# Patient Record
Sex: MALE | Race: WHITE | Employment: FULL TIME | URBAN - METROPOLITAN AREA
[De-identification: names, ages, dates, MRNs, and addresses within clinical notes are randomized per-mention and may not be internally consistent; named-entity substitution may affect disease eponyms.]

---

## 2021-04-13 ENCOUNTER — HOSPITAL ENCOUNTER (EMERGENCY)
Age: 61
Discharge: HOME | End: 2021-04-13
Attending: EMERGENCY MEDICINE
Payer: COMMERCIAL

## 2021-04-13 ENCOUNTER — APPOINTMENT (OUTPATIENT)
Dept: CT IMAGING | Age: 61
End: 2021-04-13
Payer: COMMERCIAL

## 2021-04-13 ENCOUNTER — APPOINTMENT (OUTPATIENT)
Dept: NUCLEAR MEDICINE | Age: 61
End: 2021-04-13
Payer: COMMERCIAL

## 2021-04-13 ENCOUNTER — APPOINTMENT (OUTPATIENT)
Dept: GENERAL RADIOLOGY | Age: 61
End: 2021-04-13
Payer: COMMERCIAL

## 2021-04-13 VITALS
DIASTOLIC BLOOD PRESSURE: 70 MMHG | BODY MASS INDEX: 23.59 KG/M2 | OXYGEN SATURATION: 98 % | WEIGHT: 178 LBS | HEIGHT: 73 IN | SYSTOLIC BLOOD PRESSURE: 131 MMHG | RESPIRATION RATE: 18 BRPM | TEMPERATURE: 98.6 F | HEART RATE: 74 BPM

## 2021-04-13 DIAGNOSIS — K25.9 GASTRIC ULCER, UNSPECIFIED CHRONICITY, UNSPECIFIED WHETHER GASTRIC ULCER HEMORRHAGE OR PERFORATION PRESENT: Primary | ICD-10-CM

## 2021-04-13 DIAGNOSIS — R07.89 CHEST PRESSURE: ICD-10-CM

## 2021-04-13 LAB
ALBUMIN SERPL-MCNC: 4.1 GM/DL (ref 3.4–5)
ALP BLD-CCNC: 70 IU/L (ref 40–129)
ALT SERPL-CCNC: 32 U/L (ref 10–40)
ANION GAP SERPL CALCULATED.3IONS-SCNC: 7 MMOL/L (ref 4–16)
AST SERPL-CCNC: 16 IU/L (ref 15–37)
BACTERIA: NEGATIVE /HPF
BASOPHILS ABSOLUTE: 0.1 K/CU MM
BASOPHILS RELATIVE PERCENT: 0.4 % (ref 0–1)
BILIRUB SERPL-MCNC: 0.5 MG/DL (ref 0–1)
BILIRUBIN URINE: NEGATIVE MG/DL
BLOOD, URINE: ABNORMAL
BUN BLDV-MCNC: 22 MG/DL (ref 6–23)
CALCIUM SERPL-MCNC: 9.7 MG/DL (ref 8.3–10.6)
CHLORIDE BLD-SCNC: 94 MMOL/L (ref 99–110)
CLARITY: CLEAR
CO2: 28 MMOL/L (ref 21–32)
COLOR: YELLOW
CREAT SERPL-MCNC: 0.9 MG/DL (ref 0.9–1.3)
DIFFERENTIAL TYPE: ABNORMAL
EKG ATRIAL RATE: 75 BPM
EKG ATRIAL RATE: 76 BPM
EKG DIAGNOSIS: NORMAL
EKG DIAGNOSIS: NORMAL
EKG P AXIS: 12 DEGREES
EKG P AXIS: 13 DEGREES
EKG P-R INTERVAL: 134 MS
EKG P-R INTERVAL: 148 MS
EKG Q-T INTERVAL: 366 MS
EKG Q-T INTERVAL: 374 MS
EKG QRS DURATION: 74 MS
EKG QRS DURATION: 80 MS
EKG QTC CALCULATION (BAZETT): 411 MS
EKG QTC CALCULATION (BAZETT): 417 MS
EKG R AXIS: 41 DEGREES
EKG R AXIS: 42 DEGREES
EKG T AXIS: 58 DEGREES
EKG T AXIS: 58 DEGREES
EKG VENTRICULAR RATE: 75 BPM
EKG VENTRICULAR RATE: 76 BPM
EOSINOPHILS ABSOLUTE: 0.1 K/CU MM
EOSINOPHILS RELATIVE PERCENT: 0.4 % (ref 0–3)
GFR AFRICAN AMERICAN: >60 ML/MIN/1.73M2
GFR NON-AFRICAN AMERICAN: >60 ML/MIN/1.73M2
GLUCOSE BLD-MCNC: 135 MG/DL (ref 70–99)
GLUCOSE, URINE: NEGATIVE MG/DL
HCT VFR BLD CALC: 49.8 % (ref 42–52)
HEMOGLOBIN: 16.5 GM/DL (ref 13.5–18)
IMMATURE NEUTROPHIL %: 0.4 % (ref 0–0.43)
KETONES, URINE: NEGATIVE MG/DL
LEUKOCYTE ESTERASE, URINE: NEGATIVE
LIPASE: 104 IU/L (ref 13–60)
LV EF: 53 %
LV EF: 60 %
LVEF MODALITY: NORMAL
LVEF MODALITY: NORMAL
LYMPHOCYTES ABSOLUTE: 2.3 K/CU MM
LYMPHOCYTES RELATIVE PERCENT: 13.6 % (ref 24–44)
MCH RBC QN AUTO: 30.1 PG (ref 27–31)
MCHC RBC AUTO-ENTMCNC: 33.1 % (ref 32–36)
MCV RBC AUTO: 90.9 FL (ref 78–100)
MONOCYTES ABSOLUTE: 1 K/CU MM
MONOCYTES RELATIVE PERCENT: 5.8 % (ref 0–4)
MUCUS: ABNORMAL HPF
NITRITE URINE, QUANTITATIVE: NEGATIVE
NUCLEATED RBC %: 0 %
PDW BLD-RTO: 12.6 % (ref 11.7–14.9)
PH, URINE: 6 (ref 5–8)
PLATELET # BLD: 404 K/CU MM (ref 140–440)
PMV BLD AUTO: 8.9 FL (ref 7.5–11.1)
POTASSIUM SERPL-SCNC: 4.6 MMOL/L (ref 3.5–5.1)
PRO-BNP: 38.86 PG/ML
PROTEIN UA: NEGATIVE MG/DL
RBC # BLD: 5.48 M/CU MM (ref 4.6–6.2)
RBC URINE: ABNORMAL /HPF (ref 0–3)
SEGMENTED NEUTROPHILS ABSOLUTE COUNT: 13.2 K/CU MM
SEGMENTED NEUTROPHILS RELATIVE PERCENT: 79.4 % (ref 36–66)
SODIUM BLD-SCNC: 129 MMOL/L (ref 135–145)
SPECIFIC GRAVITY UA: 1.01 (ref 1–1.03)
TOTAL IMMATURE NEUTOROPHIL: 0.06 K/CU MM
TOTAL NUCLEATED RBC: 0 K/CU MM
TOTAL PROTEIN: 7.3 GM/DL (ref 6.4–8.2)
TRICHOMONAS: ABNORMAL /HPF
TROPONIN T: <0.01 NG/ML
TROPONIN T: <0.01 NG/ML
UROBILINOGEN, URINE: NEGATIVE MG/DL (ref 0.2–1)
WBC # BLD: 16.6 K/CU MM (ref 4–10.5)
WBC UA: <1 /HPF (ref 0–2)

## 2021-04-13 PROCEDURE — 93005 ELECTROCARDIOGRAM TRACING: CPT | Performed by: EMERGENCY MEDICINE

## 2021-04-13 PROCEDURE — 71275 CT ANGIOGRAPHY CHEST: CPT

## 2021-04-13 PROCEDURE — 71045 X-RAY EXAM CHEST 1 VIEW: CPT

## 2021-04-13 PROCEDURE — A9500 TC99M SESTAMIBI: HCPCS | Performed by: INTERNAL MEDICINE

## 2021-04-13 PROCEDURE — 2500000003 HC RX 250 WO HCPCS: Performed by: EMERGENCY MEDICINE

## 2021-04-13 PROCEDURE — 80053 COMPREHEN METABOLIC PANEL: CPT

## 2021-04-13 PROCEDURE — C9113 INJ PANTOPRAZOLE SODIUM, VIA: HCPCS | Performed by: EMERGENCY MEDICINE

## 2021-04-13 PROCEDURE — 2580000003 HC RX 258: Performed by: EMERGENCY MEDICINE

## 2021-04-13 PROCEDURE — 93010 ELECTROCARDIOGRAM REPORT: CPT | Performed by: INTERNAL MEDICINE

## 2021-04-13 PROCEDURE — 96374 THER/PROPH/DIAG INJ IV PUSH: CPT

## 2021-04-13 PROCEDURE — 3430000000 HC RX DIAGNOSTIC RADIOPHARMACEUTICAL: Performed by: INTERNAL MEDICINE

## 2021-04-13 PROCEDURE — 83880 ASSAY OF NATRIURETIC PEPTIDE: CPT

## 2021-04-13 PROCEDURE — 6360000002 HC RX W HCPCS: Performed by: EMERGENCY MEDICINE

## 2021-04-13 PROCEDURE — 6360000004 HC RX CONTRAST MEDICATION: Performed by: EMERGENCY MEDICINE

## 2021-04-13 PROCEDURE — 93017 CV STRESS TEST TRACING ONLY: CPT

## 2021-04-13 PROCEDURE — 93306 TTE W/DOPPLER COMPLETE: CPT

## 2021-04-13 PROCEDURE — 74177 CT ABD & PELVIS W/CONTRAST: CPT

## 2021-04-13 PROCEDURE — 99243 OFF/OP CNSLTJ NEW/EST LOW 30: CPT | Performed by: INTERNAL MEDICINE

## 2021-04-13 PROCEDURE — 85025 COMPLETE CBC W/AUTO DIFF WBC: CPT

## 2021-04-13 PROCEDURE — 6360000002 HC RX W HCPCS: Performed by: INTERNAL MEDICINE

## 2021-04-13 PROCEDURE — 84484 ASSAY OF TROPONIN QUANT: CPT

## 2021-04-13 PROCEDURE — 99285 EMERGENCY DEPT VISIT HI MDM: CPT

## 2021-04-13 PROCEDURE — 6370000000 HC RX 637 (ALT 250 FOR IP): Performed by: EMERGENCY MEDICINE

## 2021-04-13 PROCEDURE — 83690 ASSAY OF LIPASE: CPT

## 2021-04-13 PROCEDURE — 81001 URINALYSIS AUTO W/SCOPE: CPT

## 2021-04-13 PROCEDURE — 96375 TX/PRO/DX INJ NEW DRUG ADDON: CPT

## 2021-04-13 PROCEDURE — 78452 HT MUSCLE IMAGE SPECT MULT: CPT

## 2021-04-13 RX ORDER — PANTOPRAZOLE SODIUM 40 MG/10ML
40 INJECTION, POWDER, LYOPHILIZED, FOR SOLUTION INTRAVENOUS ONCE
Status: COMPLETED | OUTPATIENT
Start: 2021-04-13 | End: 2021-04-13

## 2021-04-13 RX ORDER — SODIUM CHLORIDE 0.9 % (FLUSH) 0.9 %
10 SYRINGE (ML) INJECTION
Status: COMPLETED | OUTPATIENT
Start: 2021-04-13 | End: 2021-04-13

## 2021-04-13 RX ORDER — 0.9 % SODIUM CHLORIDE 0.9 %
1000 INTRAVENOUS SOLUTION INTRAVENOUS ONCE
Status: COMPLETED | OUTPATIENT
Start: 2021-04-13 | End: 2021-04-13

## 2021-04-13 RX ORDER — ASPIRIN 81 MG/1
324 TABLET, CHEWABLE ORAL ONCE
Status: COMPLETED | OUTPATIENT
Start: 2021-04-13 | End: 2021-04-13

## 2021-04-13 RX ORDER — SUCRALFATE 1 G/1
1 TABLET ORAL 4 TIMES DAILY
Qty: 120 TABLET | Refills: 3 | Status: SHIPPED | OUTPATIENT
Start: 2021-04-13

## 2021-04-13 RX ORDER — MORPHINE SULFATE 4 MG/ML
4 INJECTION, SOLUTION INTRAMUSCULAR; INTRAVENOUS ONCE
Status: COMPLETED | OUTPATIENT
Start: 2021-04-13 | End: 2021-04-13

## 2021-04-13 RX ORDER — FAMOTIDINE 40 MG/1
40 TABLET, FILM COATED ORAL DAILY
Qty: 30 TABLET | Refills: 0 | Status: SHIPPED | OUTPATIENT
Start: 2021-04-13

## 2021-04-13 RX ORDER — PANTOPRAZOLE SODIUM 20 MG/1
20 TABLET, DELAYED RELEASE ORAL DAILY
Qty: 30 TABLET | Refills: 0 | Status: SHIPPED | OUTPATIENT
Start: 2021-04-13

## 2021-04-13 RX ORDER — ONDANSETRON 2 MG/ML
4 INJECTION INTRAMUSCULAR; INTRAVENOUS ONCE
Status: COMPLETED | OUTPATIENT
Start: 2021-04-13 | End: 2021-04-13

## 2021-04-13 RX ORDER — ONDANSETRON 4 MG/1
4 TABLET, ORALLY DISINTEGRATING ORAL EVERY 8 HOURS PRN
Qty: 15 TABLET | Refills: 0 | Status: SHIPPED | OUTPATIENT
Start: 2021-04-13

## 2021-04-13 RX ADMIN — MORPHINE SULFATE 4 MG: 4 INJECTION, SOLUTION INTRAMUSCULAR; INTRAVENOUS at 10:41

## 2021-04-13 RX ADMIN — Medication 10 MILLICURIE: at 10:10

## 2021-04-13 RX ADMIN — Medication 30 MILLICURIE: at 11:45

## 2021-04-13 RX ADMIN — ASPIRIN 324 MG: 81 TABLET, CHEWABLE ORAL at 09:12

## 2021-04-13 RX ADMIN — ONDANSETRON 4 MG: 2 INJECTION INTRAMUSCULAR; INTRAVENOUS at 10:41

## 2021-04-13 RX ADMIN — FAMOTIDINE 20 MG: 10 INJECTION INTRAVENOUS at 10:41

## 2021-04-13 RX ADMIN — REGADENOSON 0.4 MG: 0.08 INJECTION, SOLUTION INTRAVENOUS at 11:44

## 2021-04-13 RX ADMIN — SODIUM CHLORIDE, PRESERVATIVE FREE 10 ML: 5 INJECTION INTRAVENOUS at 11:00

## 2021-04-13 RX ADMIN — SODIUM CHLORIDE 1000 ML: 9 INJECTION, SOLUTION INTRAVENOUS at 10:24

## 2021-04-13 RX ADMIN — IOPAMIDOL 75 ML: 755 INJECTION, SOLUTION INTRAVENOUS at 11:00

## 2021-04-13 RX ADMIN — PANTOPRAZOLE SODIUM 40 MG: 40 INJECTION, POWDER, FOR SOLUTION INTRAVENOUS at 13:24

## 2021-04-13 ASSESSMENT — PAIN DESCRIPTION - LOCATION: LOCATION: ABDOMEN

## 2021-04-13 ASSESSMENT — PAIN DESCRIPTION - PAIN TYPE: TYPE: ACUTE PAIN

## 2021-04-13 ASSESSMENT — PAIN SCALES - GENERAL
PAINLEVEL_OUTOF10: 6
PAINLEVEL_OUTOF10: 3

## 2021-04-13 NOTE — ED PROVIDER NOTES
CHIEF COMPLAINT  Chief Complaint   Patient presents with    Abdominal Pain    Chest Pain       HPI  Phong Louise is a 57 y.o. male with history of relative previous health, exercises for an hour daily, no daily medications who presents 1 week of abdominal and epigastric pain which was burning, caused him to have associated nausea and decreased appetite. He states that yearly he has approximately a week where similar signs and symptoms occur. He denies any blood per rectum, any vomiting but he is only been drinking water and he has been attempting to rest.  Today he was at work, was working and he developed centralized chest tightness with radiation to bilateral scapula with shortness of breath, diaphoresis and lightheadedness. At that time he started to become concerned it was his heart and decided to present here to the department. In ER, lateral leg swelling, history of DVT or PE. He denies any coronavirus exposure. REVIEW OF SYSTEMS  Review of Systems   History obtained from chart review, the patient and wife at bedside  General ROS: negative for - chills or fever  Ophthalmic ROS: negative for - decreased vision or double vision  ENT ROS: negative for - headaches  Hematological and Lymphatic ROS: negative for - bleeding problems  Endocrine ROS: negative for - unexpected weight changes  Respiratory ROS: positive for - shortness of breath  Cardiovascular ROS: positive for - chest pain  Gastrointestinal ROS: positive for -epigastric pain, nausea  Genito-Urinary ROS: no dysuria, trouble voiding, or hematuria  Musculoskeletal ROS: negative for - joint pain, joint stiffness or muscle pain  Neurological ROS: no TIA or stroke symptoms      PAST MEDICAL HISTORY  History reviewed. No pertinent past medical history. FAMILY HISTORY  History reviewed. No pertinent family history.     SOCIAL HISTORY  Social History     Socioeconomic History    Marital status:      Spouse name: None    Number of children: None    Years of education: None    Highest education level: None   Occupational History    None   Social Needs    Financial resource strain: None    Food insecurity     Worry: None     Inability: None    Transportation needs     Medical: None     Non-medical: None   Tobacco Use    Smoking status: Never Smoker    Smokeless tobacco: Never Used   Substance and Sexual Activity    Alcohol use: Yes     Alcohol/week: 0.0 standard drinks     Comment: SOCIALLY    Drug use: No    Sexual activity: None   Lifestyle    Physical activity     Days per week: None     Minutes per session: None    Stress: None   Relationships    Social connections     Talks on phone: None     Gets together: None     Attends Faith service: None     Active member of club or organization: None     Attends meetings of clubs or organizations: None     Relationship status: None    Intimate partner violence     Fear of current or ex partner: None     Emotionally abused: None     Physically abused: None     Forced sexual activity: None   Other Topics Concern    None   Social History Narrative    None       SURGICAL HISTORY  Past Surgical History:   Procedure Laterality Date    LUMBAR [address] (4&5), 1996 (5, S1), 2013    PILONIDAL CYST EXCISION         CURRENT MEDICATIONS  No current facility-administered medications on file prior to encounter. No current outpatient medications on file prior to encounter. ALLERGIES  No Known Allergies    PHYSICAL EXAM  VITAL SIGNS: /70   Pulse 74   Temp 98.6 °F (37 °C) (Oral)   Resp 18   Ht 6' 1\" (1.854 m)   Wt 178 lb (80.7 kg)   SpO2 98%   BMI 23.48 kg/m²   Constitutional: Well developed, Well nourished, No acute distress, Non-toxic appearance. HENT: Normocephalic, Atraumatic, Bilateral external ears normal, Oropharynx moist, No oral exudates, Nose normal.   Eyes: PERRL, EOMI, Conjunctiva normal, No discharge.    Neck: Normal range of motion, Supple, No stridor. Cardiovascular: Normal heart rate, Normal rhythm, No murmurs, No rubs, No gallops. Thorax & Lungs: Normal breath sounds, No respiratory distress, No wheezing, No chest tenderness. Abdomen: Bowel sounds normal, Soft, No tenderness, no guarding, no rebound, No masses, No pulsatile masses. Skin: Warm, diaphoretic with face flushed, No erythema, No rash. Extremities: Intact distal pulses, No edema, No tenderness, No cyanosis, No clubbing. No palpable cord  Musculoskeletal: Good gross range of motion in all major joints. No major deformities noted. Neurologic: Alert & oriented x 3, Normal gross motor function, Normal gross sensory function, No focal deficits noted. Psychiatric: Affect normal    EKG  839 4/13 EKG Interpretation    Interpreted by emergency department physician    Rhythm: normal sinus   Rate: normal  Axis: normal  Ectopy: none  Conduction: normal  ST Segments: Hyperacute T waves in inferior and lateral leads  T Waves: hyperacute in  multiple  Q Waves: none    Clinical Impression: non-specific EKG    Precious Craft    EKG Interpretation from April 13 at 64    Interpreted by emergency department physician    Rhythm: normal sinus   Rate: normal  Axis: normal  Ectopy: none  Conduction: normal  ST Segments: Hyperacute ST segments inferior and lateral  T Waves: hyperacute in inferior and lateral  Q Waves: none    Clinical Impression: Stable from initial EKG, continued hyperacute segments, discussed with cardiology who states that they think it is likely secondary to this patient's underlying body habitus and less likely ischemia, they plan on ordering an echo    Formerly [facility], later [facility]      RADIOLOGY/PROCEDURES/LABS  Last Imaging results   NM MYOCARDIAL SPECT REST EXERCISE OR RX   Final Result      CT ABDOMEN PELVIS W IV CONTRAST Additional Contrast? None   Final Result   1.  Focal irregularity is present along the posterior wall of the gastric   antrum with possible mucosal hyperemia, suggestive of a gastric ulcer. Correlation with upper endoscopy is recommended. No evidence of perforation. 2. No evidence of pulmonary embolism or acute process within the chest.         CTA PULMONARY W CONTRAST   Final Result   1. Focal irregularity is present along the posterior wall of the gastric   antrum with possible mucosal hyperemia, suggestive of a gastric ulcer. Correlation with upper endoscopy is recommended. No evidence of perforation. 2. No evidence of pulmonary embolism or acute process within the chest.         XR CHEST PORTABLE   Final Result   No acute cardiopulmonary disease.              Imaging reviewed by myself, discussed with cardiology    Labs Reviewed   CBC WITH AUTO DIFFERENTIAL - Abnormal; Notable for the following components:       Result Value    WBC 16.6 (*)     Segs Relative 79.4 (*)     Lymphocytes % 13.6 (*)     Monocytes % 5.8 (*)     All other components within normal limits   COMPREHENSIVE METABOLIC PANEL - Abnormal; Notable for the following components:    Sodium 129 (*)     Chloride 94 (*)     Glucose 135 (*)     All other components within normal limits   LIPASE - Abnormal; Notable for the following components:    Lipase 104 (*)     All other components within normal limits   URINALYSIS - Abnormal; Notable for the following components:    Blood, Urine SMALL (*)     Mucus, UA RARE (*)     All other components within normal limits   TROPONIN   BRAIN NATRIURETIC PEPTIDE   TROPONIN         Medications   aspirin chewable tablet 324 mg (324 mg Oral Given 4/13/21 0912)   0.9 % sodium chloride bolus (0 mLs Intravenous Stopped 4/13/21 1316)   technetium sestamibi (CARDIOLITE) injection 10 millicurie (10 millicuries Intravenous Given 4/13/21 1010)   technetium sestamibi (CARDIOLITE) injection 30 millicurie (30 millicuries Intravenous Given 4/13/21 1145)   iopamidol (ISOVUE-370) 76 % injection 75 mL (75 mLs Intravenous Given 4/13/21 1100)   sodium chloride flush 0.9 % injection 10 mL (10 mLs Intravenous Given 4/13/21 1100)   morphine sulfate (PF) injection 4 mg (4 mg Intravenous Given 4/13/21 1041)   ondansetron (ZOFRAN) injection 4 mg (4 mg Intravenous Given 4/13/21 1041)   famotidine (PEPCID) injection 20 mg (20 mg Intravenous Given 4/13/21 1041)   regadenoson (LEXISCAN) injection 0.4 mg (0.4 mg Intravenous Given 4/13/21 1144)   pantoprazole (PROTONIX) injection 40 mg (40 mg Intravenous Given 4/13/21 1324)       COURSE & MEDICAL DECISION MAKING  Pertinent Labs & Imaging studies reviewed. (See chart for details)    60-year-old male presents with chest pain. His chest pain is likely radiation of the abdominal pain as his CT scan shows thickening concerning for PUD. As the patient had waxing waning signs and symptoms like this for years, it is possible he has underlying ulcer. There is no evidence of free air or suggestion of perforation of the PUD. Based on his chest pain with the radiation and the diaphoresis, we were concerned this was a sign of ACS. With his hyperacute ST segments, I did repeat his EKG, they remained stable in the department. Cardiology responded at bedside, they were able to complete a stress test which is not suggestive of ACS or CAD at this time. His 2 troponins are nonischemic. After symptom treatment in the department he is feeling significantly improved, he is not having active chest pain or shortness of breath, his work-up is not suggestive of PE, pneumonia or coronavirus. As he is feeling significantly improved, and he is hemodynamically stable with reassuring cardiac rule out, cardiology feels comfortable following this patient as an outpatient due to lower cardiac risk at this time. Patient is agreeable this plan of care, he will be referred to GI, started on acid reducing medications and given strict return precautions. He is not having suggestion of GI bleed at this time, he is agreeable to call GI to arrange close follow-up.     CRITICAL CARE NOTE:  There was a

## 2021-04-13 NOTE — PROGRESS NOTES
Medication History  [facility]    Patient Name: Alok Harris 1960     Medication history has been completed by: Denadre Wan CPhT    Source(s) of information: patient     Primary Care Physician: Elizabeth Ferrer MD     Pharmacy: [facility]    Allergies as of 04/13/2021    (No Known Allergies)        Prior to Admission medications    Medication Sig Start Date End Date Taking? Authorizing Provider   pantoprazole (PROTONIX) 20 MG tablet Take 1 tablet by mouth daily 4/13/21  Yes Ambreen Valladares MD   famotidine (PEPCID) 40 MG tablet Take 1 tablet by mouth daily 4/13/21  Yes Ambreen Valladares MD   sucralfate (CARAFATE) 1 GM tablet Take 1 tablet by mouth 4 times daily 4/13/21  Yes Ambreen Valladares MD   ondansetron (ZOFRAN ODT) 4 MG disintegrating tablet Take 1 tablet by mouth every 8 hours as needed for Nausea 4/13/21  Yes Ambreen Valladares MD     Comments:  Patient states he takes no medications at home. New medications listed per this visit.     To my knowledge the above medication history is accurate as of 4/13/2021 1:27 PM.   Deandre Wan CPhT   4/13/2021 1:27 PM

## 2021-04-13 NOTE — CONSULTS
Name:  Roseline Vazquez /Age/Sex: 1960  (57 y.o. male)   MRN & CSN:  [id number] & [id number] Admission Date/Time: 2021  8:45 AM   Location:  [facility]/04TR-04 PCP: Ronn Li MD       Hospital Day: 1          Referring physician:  No admitting provider for patient encounter. Reason for consultation:  CP        Thanks for referral.    Information source: patient    CC;  CP      HPI:   Thank you for involving me in taking  care of Roseline Vazquez who  is a 57 y. o.year  Old male  Presents with  Has no cardiac history, physically active came in with severe abd and moderate  mid chest pain, nonexertional , had mild SOB. EKG with nonsp ST changes, trops are negative. Past medical history:    has no past medical history on file. Past surgical history:   has a past surgical history that includes Lumbar disc surgery ( (4&5),  (5, S1), ) and Pilonidal cyst excision. Social History:   reports that he has never smoked. He has never used smokeless tobacco. He reports current alcohol use. He reports that he does not use drugs. Family history:  family history is not on file. No Known Allergies    0.9 % sodium chloride bolus, Once      Current Facility-Administered Medications   Medication Dose Route Frequency Provider Last Rate Last Admin    0.9 % sodium chloride bolus  1,000 mL Intravenous Once Wally Arce MD         No current outpatient medications on file. Review of Systems:  All 14 systems reviewed, all negative except for  CP    Physical Examination:    BP (!) 158/84   Pulse 91   Temp 98.6 °F (37 °C) (Oral)   Resp 18   Ht 6' 1\" (1.854 m)   Wt 178 lb (80.7 kg)   SpO2 100%   BMI 23.48 kg/m²      Wt Readings from Last 3 Encounters:   21 178 lb (80.7 kg)   16 181 lb 12.8 oz (82.5 kg)   11/22/15 188 lb (85.3 kg)     Body mass index is 23.48 kg/m².       General Appearance:  fair  Head: normocephalic     Eyes: normal, noninjected

## 2021-10-21 ENCOUNTER — APPOINTMENT (OUTPATIENT)
Dept: CT IMAGING | Age: 61
End: 2021-10-21
Payer: COMMERCIAL

## 2021-10-21 ENCOUNTER — HOSPITAL ENCOUNTER (EMERGENCY)
Age: 61
Discharge: HOME | End: 2021-10-21
Attending: EMERGENCY MEDICINE
Payer: COMMERCIAL

## 2021-10-21 VITALS
TEMPERATURE: 98 F | HEART RATE: 76 BPM | OXYGEN SATURATION: 97 % | RESPIRATION RATE: 15 BRPM | SYSTOLIC BLOOD PRESSURE: 146 MMHG | DIASTOLIC BLOOD PRESSURE: 91 MMHG | BODY MASS INDEX: 23.75 KG/M2 | WEIGHT: 180 LBS

## 2021-10-21 DIAGNOSIS — J03.90 TONSILLITIS: Primary | ICD-10-CM

## 2021-10-21 DIAGNOSIS — J36 CELLULITIS OF TONSIL: ICD-10-CM

## 2021-10-21 DIAGNOSIS — K12.2 UVULITIS: ICD-10-CM

## 2021-10-21 DIAGNOSIS — D72.829 LEUKOCYTOSIS, UNSPECIFIED TYPE: ICD-10-CM

## 2021-10-21 LAB
ALBUMIN SERPL-MCNC: 4.5 GM/DL (ref 3.4–5)
ALP BLD-CCNC: 69 IU/L (ref 40–129)
ALT SERPL-CCNC: 28 U/L (ref 10–40)
ANION GAP SERPL CALCULATED.3IONS-SCNC: 11 MMOL/L (ref 4–16)
AST SERPL-CCNC: 16 IU/L (ref 15–37)
BASOPHILS ABSOLUTE: 0 K/CU MM
BASOPHILS RELATIVE PERCENT: 0.2 % (ref 0–1)
BILIRUB SERPL-MCNC: 0.7 MG/DL (ref 0–1)
BUN BLDV-MCNC: 11 MG/DL (ref 6–23)
CALCIUM SERPL-MCNC: 9.6 MG/DL (ref 8.3–10.6)
CHLORIDE BLD-SCNC: 100 MMOL/L (ref 99–110)
CO2: 23 MMOL/L (ref 21–32)
CREAT SERPL-MCNC: 0.6 MG/DL (ref 0.9–1.3)
DIFFERENTIAL TYPE: ABNORMAL
EOSINOPHILS ABSOLUTE: 0 K/CU MM
EOSINOPHILS RELATIVE PERCENT: 0.2 % (ref 0–3)
GFR AFRICAN AMERICAN: >60 ML/MIN/1.73M2
GFR NON-AFRICAN AMERICAN: >60 ML/MIN/1.73M2
GLUCOSE BLD-MCNC: 131 MG/DL (ref 70–99)
HCT VFR BLD CALC: 45 % (ref 42–52)
HEMOGLOBIN: 15.2 GM/DL (ref 13.5–18)
HETEROPHILE ANTIBODIES: NEGATIVE
IMMATURE NEUTROPHIL %: 0.4 % (ref 0–0.43)
LYMPHOCYTES ABSOLUTE: 1.9 K/CU MM
LYMPHOCYTES RELATIVE PERCENT: 10 % (ref 24–44)
MCH RBC QN AUTO: 31.5 PG (ref 27–31)
MCHC RBC AUTO-ENTMCNC: 33.8 % (ref 32–36)
MCV RBC AUTO: 93.2 FL (ref 78–100)
MONOCYTES ABSOLUTE: 1.6 K/CU MM
MONOCYTES RELATIVE PERCENT: 8.7 % (ref 0–4)
NUCLEATED RBC %: 0 %
PDW BLD-RTO: 12.8 % (ref 11.7–14.9)
PLATELET # BLD: 290 K/CU MM (ref 140–440)
PMV BLD AUTO: 9.5 FL (ref 7.5–11.1)
POTASSIUM SERPL-SCNC: 4 MMOL/L (ref 3.5–5.1)
RBC # BLD: 4.83 M/CU MM (ref 4.6–6.2)
SEGMENTED NEUTROPHILS ABSOLUTE COUNT: 15.2 K/CU MM
SEGMENTED NEUTROPHILS RELATIVE PERCENT: 80.5 % (ref 36–66)
SODIUM BLD-SCNC: 134 MMOL/L (ref 135–145)
TOTAL IMMATURE NEUTOROPHIL: 0.08 K/CU MM
TOTAL NUCLEATED RBC: 0 K/CU MM
TOTAL PROTEIN: 7.6 GM/DL (ref 6.4–8.2)
WBC # BLD: 18.9 K/CU MM (ref 4–10.5)

## 2021-10-21 PROCEDURE — 2500000003 HC RX 250 WO HCPCS: Performed by: PHYSICIAN ASSISTANT

## 2021-10-21 PROCEDURE — 87430 STREP A AG IA: CPT

## 2021-10-21 PROCEDURE — 6360000004 HC RX CONTRAST MEDICATION: Performed by: PHYSICIAN ASSISTANT

## 2021-10-21 PROCEDURE — 80053 COMPREHEN METABOLIC PANEL: CPT

## 2021-10-21 PROCEDURE — 2580000003 HC RX 258: Performed by: PHYSICIAN ASSISTANT

## 2021-10-21 PROCEDURE — 86318 IA INFECTIOUS AGENT ANTIBODY: CPT

## 2021-10-21 PROCEDURE — 85025 COMPLETE CBC W/AUTO DIFF WBC: CPT

## 2021-10-21 PROCEDURE — 99285 EMERGENCY DEPT VISIT HI MDM: CPT

## 2021-10-21 PROCEDURE — 70491 CT SOFT TISSUE NECK W/DYE: CPT

## 2021-10-21 PROCEDURE — 6360000002 HC RX W HCPCS: Performed by: PHYSICIAN ASSISTANT

## 2021-10-21 PROCEDURE — 96375 TX/PRO/DX INJ NEW DRUG ADDON: CPT

## 2021-10-21 PROCEDURE — 96365 THER/PROPH/DIAG IV INF INIT: CPT

## 2021-10-21 PROCEDURE — 87081 CULTURE SCREEN ONLY: CPT

## 2021-10-21 RX ORDER — KETOROLAC TROMETHAMINE 30 MG/ML
30 INJECTION, SOLUTION INTRAMUSCULAR; INTRAVENOUS ONCE
Status: COMPLETED | OUTPATIENT
Start: 2021-10-21 | End: 2021-10-21

## 2021-10-21 RX ORDER — SODIUM CHLORIDE 0.9 % (FLUSH) 0.9 %
10 SYRINGE (ML) INJECTION PRN
Status: DISCONTINUED | OUTPATIENT
Start: 2021-10-21 | End: 2021-10-21 | Stop reason: HOSPADM

## 2021-10-21 RX ORDER — PREDNISONE 50 MG/1
50 TABLET ORAL DAILY
Qty: 5 TABLET | Refills: 0 | Status: SHIPPED | OUTPATIENT
Start: 2021-10-23 | End: 2021-10-28

## 2021-10-21 RX ORDER — CLINDAMYCIN HYDROCHLORIDE 150 MG/1
450 CAPSULE ORAL 4 TIMES DAILY
Qty: 120 CAPSULE | Refills: 0 | Status: SHIPPED | OUTPATIENT
Start: 2021-10-21 | End: 2021-10-31

## 2021-10-21 RX ORDER — DEXAMETHASONE SODIUM PHOSPHATE 10 MG/ML
10 INJECTION, SOLUTION INTRAMUSCULAR; INTRAVENOUS ONCE
Status: COMPLETED | OUTPATIENT
Start: 2021-10-21 | End: 2021-10-21

## 2021-10-21 RX ORDER — CLINDAMYCIN PHOSPHATE 600 MG/50ML
600 INJECTION INTRAVENOUS ONCE
Status: COMPLETED | OUTPATIENT
Start: 2021-10-21 | End: 2021-10-21

## 2021-10-21 RX ADMIN — DEXAMETHASONE SODIUM PHOSPHATE 10 MG: 10 INJECTION INTRAMUSCULAR; INTRAVENOUS at 09:19

## 2021-10-21 RX ADMIN — KETOROLAC TROMETHAMINE 30 MG: 30 INJECTION, SOLUTION INTRAMUSCULAR; INTRAVENOUS at 09:19

## 2021-10-21 RX ADMIN — SODIUM CHLORIDE, PRESERVATIVE FREE 10 ML: 5 INJECTION INTRAVENOUS at 10:29

## 2021-10-21 RX ADMIN — CLINDAMYCIN PHOSPHATE 600 MG: 600 INJECTION, SOLUTION INTRAVENOUS at 12:40

## 2021-10-21 RX ADMIN — IOPAMIDOL 80 ML: 755 INJECTION, SOLUTION INTRAVENOUS at 10:28

## 2021-10-21 ASSESSMENT — PAIN DESCRIPTION - PAIN TYPE: TYPE: ACUTE PAIN

## 2021-10-21 ASSESSMENT — PAIN SCALES - GENERAL: PAINLEVEL_OUTOF10: 3

## 2021-10-21 ASSESSMENT — PAIN DESCRIPTION - LOCATION: LOCATION: EAR;THROAT

## 2021-10-21 ASSESSMENT — PAIN DESCRIPTION - FREQUENCY: FREQUENCY: CONTINUOUS

## 2021-10-21 NOTE — ED NOTES
Swallow screen eval. Pt passed the swallow screen. Reports no concerns from it. PT claims to be on a soft diet now. Discharge papers provided. Pt understood instructions w verbal feedback. Pt has no other concerns at this time.       Yary Quezada RN  10/21/21 8209

## 2021-10-21 NOTE — ED PROVIDER NOTES
MEDICAL AND SURGICAL HISTORY    No past medical history on file. Past Surgical History:   Procedure Laterality Date    [address]UMBAR [address] (4&5), 1996 (5, S1), 2013    PILONIDAL CYST EXCISION         CURRENT MEDICATIONS    Current Outpatient Rx   Medication Sig Dispense Refill    clindamycin (CLEOCIN) 150 MG capsule Take 3 capsules by mouth 4 times daily for 10 days 120 capsule 0    [START ON 10/23/2021] predniSONE (DELTASONE) 50 MG tablet Take 1 tablet by mouth daily for 5 days Start two days after ED visit. 5 tablet 0    pantoprazole (PROTONIX) 20 MG tablet Take 1 tablet by mouth daily 30 tablet 0    famotidine (PEPCID) 40 MG tablet Take 1 tablet by mouth daily 30 tablet 0    sucralfate (CARAFATE) 1 GM tablet Take 1 tablet by mouth 4 times daily 120 tablet 3    ondansetron (ZOFRAN ODT) 4 MG disintegrating tablet Take 1 tablet by mouth every 8 hours as needed for Nausea 15 tablet 0       ALLERGIES    No Known Allergies    FAMILY AND SOCIAL HISTORY    No family history on file. Social History     Socioeconomic History    Marital status:      Spouse name: Not on file    Number of children: Not on file    Years of education: Not on file    Highest education level: Not on file   Occupational History    Not on file   Tobacco Use    Smoking status: Never Smoker    Smokeless tobacco: Never Used   Substance and Sexual Activity    Alcohol use: Yes     Alcohol/week: 0.0 standard drinks     Comment: SOCIALLY    Drug use: No    Sexual activity: Not on file   Other Topics Concern    Not on file   Social History Narrative    Not on file     Social Determinants of Health     Financial Resource Strain:     Difficulty of Paying Living Expenses:    Food Insecurity:     Worried About Running Out of Food in the Last Year:     [address] in the Last Year:    Transportation Needs:     Lack of Transportation (Medical):      Lack of Transportation (Non-Medical):    Physical Activity:     Days of Exercise per Week:     Minutes of Exercise per Session:    Stress:     Feeling of Stress :    Social Connections:     Frequency of Communication with Friends and Family:     Frequency of Social Gatherings with Friends and Family:     Attends [employer] Services:     Active Member of Clubs or Organizations:     Attends Club or Organization Meetings:     Marital Status:    Intimate Partner Violence:     Fear of Current or Ex-Partner:     Emotionally Abused:     Physically Abused:     Sexually Abused:        PHYSICAL EXAM    VITAL SIGNS: BP (!) 146/91   Pulse 76   Temp 98 °F (36.7 °C) (Oral)   Resp 15   Wt 180 lb (81.6 kg)   SpO2 97%   BMI 23.75 kg/m²   Constitutional:  Well developed, well nourished, no acute distress, non-toxic appearance     HEENT:        - Normocephalic, atraumatic       - Frontal/Maxillary sinuses NONtender to percussion. Eyes:    - PERRL, EOM intact, conjunctiva normal, sclera non-icteric       Ears:    -  No auricular redness or induration    -  External auditory canals clear, mild erythema of the left EAC without purulence or edema.   - TMs intact, opaque with clear effusion bilaterally, no erythematous injection, purulent fluid, bulging     - Nasal passages is patent, nonedematous. No massess. Oropharynx:    - Oropharynx mildly erythematous with asymmetric left-sided tonsillar hypertrophy, 3+ on the left. No exudates.     -Uvula is midline but edematous, no significant shift. No trismus, no facial swelling. No intraoral ulcerations.   - No inspiratory stridor. Tolerating secretions. No hot potato voice   -No sublingual edema or tenderness. Neck/Lymphatics:    - Supple neck without meningismus   - + Left-sided submandibular and anterior cervical lymphadenopathy.   - Trachea is midline    Respiratory:  Clear to auscultation bilateral lung fields, no wheezes/rales/rhonchi.  No retractions, no accessory muscle use  Cardiovascular:  Normal rate, normal rhythm   GI:  Soft, no abdominal tenderness, no guarding. Musculoskeletal:  No obvious deficits, no edema   Integument:  Skin pink, warn, dry, intact.  No rash of scarletiniform appearance  Neurologic: Awake alert and oriented, and no slurred speech, no tremors or ataxia, or athetotic movements noted        LABS:  Results for orders placed or performed during the hospital encounter of 10/21/21   Strep Screen Group A Throat    Specimen: Throat   Result Value Ref Range    Specimen THROAT     Special Requests NONE     Strep A Direct Screen NEGATIVE    CBC auto diff   Result Value Ref Range    WBC 18.9 (H) 4.0 - 10.5 K/CU MM    RBC 4.83 4.6 - 6.2 M/CU MM    Hemoglobin 15.2 13.5 - 18.0 GM/DL    Hematocrit 45.0 42 - 52 %    MCV 93.2 78 - 100 FL    MCH 31.5 (H) 27 - 31 PG    MCHC 33.8 32.0 - 36.0 %    RDW 12.8 11.7 - 14.9 %    Platelets 346 450 - 529 K/CU MM    MPV 9.5 7.5 - 11.1 FL    Differential Type AUTOMATED DIFFERENTIAL     Segs Relative 80.5 (H) 36 - 66 %    Lymphocytes % 10.0 (L) 24 - 44 %    Monocytes % 8.7 (H) 0 - 4 %    Eosinophils % 0.2 0 - 3 %    Basophils % 0.2 0 - 1 %    Segs Absolute 15.2 K/CU MM    Lymphocytes Absolute 1.9 K/CU MM    Monocytes Absolute 1.6 K/CU MM    Eosinophils Absolute 0.0 K/CU MM    Basophils Absolute 0.0 K/CU MM    Nucleated RBC % 0.0 %    Total Nucleated RBC 0.0 K/CU MM    Total Immature Neutrophil 0.08 K/CU MM    Immature Neutrophil % 0.4 0 - 0.43 %   CMP   Result Value Ref Range    Sodium 134 (L) 135 - 145 MMOL/L    Potassium 4.0 3.5 - 5.1 MMOL/L    Chloride 100 99 - 110 mMol/L    CO2 23 21 - 32 MMOL/L    BUN 11 6 - 23 MG/DL    CREATININE 0.6 (L) 0.9 - 1.3 MG/DL    Glucose 131 (H) 70 - 99 MG/DL    Calcium 9.6 8.3 - 10.6 MG/DL    Albumin 4.5 3.4 - 5.0 GM/DL    Total Protein 7.6 6.4 - 8.2 GM/DL    Total Bilirubin 0.7 0.0 - 1.0 MG/DL    ALT 28 10 - 40 U/L    AST 16 15 - 37 IU/L    Alkaline Phosphatase 69 40 - 129 IU/L    GFR Non-African American >60 >60 mL/min/1.73m2    GFR African American >60 >60 mL/min/1.73m2    Anion Gap 11 4 - 16   Mononucleosis Screen   Result Value Ref Range    Monospot NEGATIVE NEGATIVE       IMAGING:      CT SOFT TISSUE NECK W CONTRAST (Final result)  Result time 10/21/21 10:56:13  Final result by Sara Linares MD (10/21/21 10:56:13)                Impression:    1. There is asymmetric prominence of the left palatine tonsil compared to the   left.  There is minimal ill-defined central decreased attenuation within the   left palatine tonsil, which likely represents focal phlegmonous change.  No   discrete abscess is seen at this time. 2. There is moderate mucosal edema extending from the left nasopharynx   inferiorly through the left piriform sinus including an edematous appearance   on the left retropharyngeal/prevertebral space. 3. Inflammatory changes are seen extending into the left parapharyngeal fat   and along the left carotid sheath structures. 4. Likely reactive left-sided jugular chain lymph nodes. Narrative:    EXAMINATION:   CT OF THE NECK SOFT TISSUE WITH CONTRAST  10/21/2021     TECHNIQUE:   CT of the neck was performed with the administration of intravenous contrast.   Multiplanar reformatted images are provided for review. Dose modulation,   iterative reconstruction, and/or weight based adjustment of the mA/kV was   utilized to reduce the radiation dose to as low as reasonably achievable. COMPARISON:   None.      HISTORY:   ORDERING SYSTEM PROVIDED HISTORY: left sided throat swelling, tonsillar   swelling, eval for PTA   TECHNOLOGIST PROVIDED HISTORY:   Reason for exam:->left sided throat swelling, tonsillar swelling, eval for PTA   Decision Support Exception - unselect if not a suspected or confirmed   emergency medical condition->Emergency Medical Condition (MA)   Reason for Exam: left sided throat swelling, tonsillar swelling, eval for PTA   Acuity: Acute     FINDINGS:   PHARYNX/LARYNX:  There is extensive streak artifact from dental hardware,   which limits evaluation of the oral cavity and oropharynx. Daphane Flies is   asymmetric prominence of the left palatine tonsil with mucosal edema   extending from the left nasopharynx through the left piriform sinus.  There   is minimal ill-defined decreased attenuation within the left palatine tonsil   (series 3, image 67).  No discrete abscess.  Inflammatory changes are seen   extending to involve the left parapharyngeal fat as well extending along the   left carotid sheath structures. SALIVARY GLANDS/THYROID:  The parotid, submandibular and thyroid glands   demonstrate no acute abnormality. LYMPH NODES:  Presumably reactive left-sided jugular chain lymph nodes are   noted.  No right cervical lymphadenopathy by size criteria. SOFT TISSUES:  As above. BRAIN/ORBITS/SINUSES:  The visualized portion of the intracranial contents   appear unremarkable.  The visualized portion of the orbits, paranasal sinuses   and mastoid air cells demonstrate no acute abnormality. LUNG APICES/SUPERIOR MEDIASTINUM:  No focal consolidation within the   visualized lung apices.  No acute abnormality within the visualized superior   mediastinum. BONES:  No aggressive appearing lytic or blastic bony lesion. ED COURSE & MEDICAL DECISION MAKING       Vital signs and nursing notes reviewed during ED course. I have independently evaluated this patient . Supervising MD  - Dr. Tracie Sue - present in the Emergency Department, available for consultation, throughout entirety of  patient care. All pertinent Lab data and radiographic results reviewed with patient at bedside. The patient and/or the family were informed of the results of any tests/labs/imaging, the treatment plan, and time was allotted to answer questions.         Differential Diagnoses:  Acute Bronchitis, Pneumonia, Airway Obstruction, Upper Respiratory Viral infection, Sinusitis, Pharyngitis, Rosalie-Tonsillar Abscess,      Clinical  IMPRESSION    1. Tonsillitis    2. Cellulitis of tonsil    3. Uvulitis    4. Leukocytosis, unspecified type        Patient presents with worsening left-sided throat pain and swelling pain on exam, pleasant nontoxic 53-year-old male, no acute respiratory distress. Tolerating oral secretions. No inspiratory stridor. HEENT exam is remarkable for left-sided tonsillar hypertrophy and asymmetry with an edematous midline uvula. No intraoral ulcerations or exudates. Trachea is midline. No sublingual edema. There is noted left-sided submandibular and anterior cervical lymphadenopathy without other nuchal rigidity. Lungs are clear auscultation. Given symmetric swelling and difficulty swallowing pills, discussed labs and CT imaging to rule out PTA versus RPA. She is comfortable agreeable this plan. Did start him on IV Toradol and Decadron. CBC shows a leukocytosis of 18.9 with left shift. Normal hemoglobin. CMP without significant derangement electrolyte disturbance, normal renal function. Rapid strep is negative. Mono screen is negative. CT soft tissue neck with contrast shows asymmetric prominence of the left palatine tonsil compared to the left with minimal ill-defined central decreased attenuation of the left palatine tonsil likely representing focal phlegmonous change without discrete abscess. There is also mucosal edema extending from the left nasopharynx inferiorly through the piriform sinus including an edematous appearance of the left retropharyngeal/prevertebral space as well as inflammatory changes extending in the left parapharyngeal fat along the left carotid sheath structures with reactive jugular chain lymph nodes. Following IV medication to the ED, patient is feeling significantly improving amount of swelling in throat. He is tolerating oral fluids as well as his secretions, continues to protect his airway.   However given his leukocytosis and CT findings, discussed concern for early developing peritonsillar abscess versus airway obstruction. Unfortunately we do not have ENT coverage available at this time on call and patient would need to be transferred to [facility] ED for ENT evaluation on an emergent basis. Patient is comfortable and agreeable this plan. I did make consult calls to [facility] as well as [address] [place] [address] however they are both not accepting transfers at this time. 1230pm: In an effort to keep patient from being transferred to a long distance facility, I then consulted with our local ENT Dr. dEward Mota. I spoke with ENT office staff whostates Dr. Caleb Corona was not going to be available for another hour as he is starts to see patients at 1 PM in the afternoon. There was an ENT nurse practitioner available in the office, Cynthia Coronado, however she was not comfortable speaking with me regarding this patient's care. Office staff does state that patient was previously seen by Dr. Edward Mota in 2018. I was able to make an appointment for the patient tomorrow on 10/22/2021 at 9:45 AM with his partner Dr. Naun Aranda, however office staff did recommend that I try calling office back after 1 PM today to see if I can speak directly with Dr. Caleb Corona regarding today CT findings. 1:30pm: Noel Jordan nurse for Dr. Caleb Corona called back, stating that as he is not on-call, Dr. Boris Mcmillan speak with me and consult on this patient since patient's last evaluation with him in 2018 was for unrelated issue. However Noel Jordan does relay that Dr. Caleb Corona did review CT imaging done today and does feel patient is safe for discharge home and follow-up with scheduled appointment tomorrow with Dr. Naun Aranda    This plan was discussed with patient who verbalized understanding agreement.   He is comfortable discharge home but I given him strict return precautions return back to the emergency department with any new or worsening symptoms including difficulty swallowing, neck stiffness, tractable fevers. We will continue him on clindamycin and oral prednisone and patient will follow up in office as scheduled tomorrow with ENT. No evidence of bacteremia/sepsis, drainable peritonsillar/retropharyngeal abscess, epiglottitis or other deep neck space infection, airway obstruction. Patient is discharged in stable condition with above medications, encourage rest, pushing fluids, warm salt water gargles, Tylenol/ibuprofen for pain. Return warnings again discussed. Educated to start prednisone in 2 days after ED visit. I did discuss this patient's history, ED presentation/course with my attending physician - Dr. Kala Sam - who has also seen and evaluated this patient. He/she does agree that discharge is reasonable at this time with ENT followup tomorrow. Please see his/her note for additional details of their evaluation. Diagnosis and plan discussed in detail with patient who understands and agrees. Patient agrees to return emergency department if symptoms worsen or any new symptoms develop. Comment: Please note this report has been produced using speech recognition software and may contain errors related to that system including errors in grammar, punctuation, and spelling, as well as words and phrases that may be inappropriate. If there are any questions or concerns please feel free to contact the dictating provider for clarification.         Soy Farris PA-C  10/21/21 3921

## 2021-10-21 NOTE — ED PROVIDER NOTES
ATTENDING NOTE:    I discussed this patient's history and physical findings and reviewed the PA's findings with them, as well as performed an independent assessment and coordinated care with them. My additional findings are: Patient is resting in bed, speaking clearly and requesting to go home. He is tolerating his secretion and requesting something to eat. He states he would be willing to follow-up with ENT tomorrow. He does have some swelling overlying the left tonsillar region. Uvula is midline. There is no edema or erythema of the external neck. HISTORY OF PRESENT ILLNESS:  Chief Complaint   Patient presents with    Otalgia    Pharyngitis   . Agustin Hanks is a 57 y.o. male who presents with with sore throat and feeling \"like something got stuck in it\" when he was eating earlier. He denies any fevers, vomiting, noisy breathing. PHYSICAL EXAM:  VITAL SIGNS:   ED Triage Vitals [10/21/21 0741]   Enc Vitals Group      BP (!) 159/90      Pulse 96      Resp 16      Temp 98.8 °F (37.1 °C)      Temp Source Oral      SpO2 99 %      Weight       Height       Head Circumference       Peak Flow       Pain Score       Pain Loc       Pain Edu? Excl. in [address]? Vitals during ED course were reviewed and are as charted. Key Physical Exam Findings:    Constitutional: Minimal distress, Non-toxic appearance.   Reclining somewhat during her interview when speaking clearly managing secretions    Eyes:  Conjunctiva normal, No discharge    HENT: Normocephalic, Atraumatic, Bilateral external ears normal, posterior pharynx with mild erythema and left tonsillar fullness, uvula is midline, no trismus, no \"hot potato voice\" or dysphonia, oropharynx moist    Neck: Supple, no stridor, no grossly visible or palpable masses    Cardiovascular: Regular rate and rhythm, No murmurs, No rubs, No gallops    Pulmonary/Chest:  Normal breath sounds, No respiratory distress or accessory muscle use, No wheezing, crackles or rhonchi. Abdomen:  Soft, nondistended and nonrigid, No tenderness or peritoneal signs, No masses, normal bowel sounds    Back:  No midline point tenderness, No paraspinous muscle tenderness.   No CVA tenderness    Extremities:  No gross deformities, no edema, no tenderness    Neurologic:  Normal motor function, Normal sensory function, No focal deficits    Skin:  Warm, Dry, No erythema, No rash, No cyanosis, No mottling    Lymphatic:  No lymphadenopathy in the following location(s): cervical    Psychiatric:  Alert and oriented x3, Affect normal          RADIOLOGY/PROCEDURES/LABS/MEDICATIONS ADMINISTERED:    I have reviewed and interpreted all of the currently available lab results from this visit (if applicable):  Results for orders placed or performed during the hospital encounter of 10/21/21   Strep Screen Group A Throat    Specimen: Throat   Result Value Ref Range    Specimen THROAT     Special Requests NONE     Strep A Direct Screen NEGATIVE    CBC auto diff   Result Value Ref Range    WBC 18.9 (H) 4.0 - 10.5 K/CU MM    RBC 4.83 4.6 - 6.2 M/CU MM    Hemoglobin 15.2 13.5 - 18.0 GM/DL    Hematocrit 45.0 42 - 52 %    MCV 93.2 78 - 100 FL    MCH 31.5 (H) 27 - 31 PG    MCHC 33.8 32.0 - 36.0 %    RDW 12.8 11.7 - 14.9 %    Platelets 779 851 - 258 K/CU MM    MPV 9.5 7.5 - 11.1 FL    Differential Type AUTOMATED DIFFERENTIAL     Segs Relative 80.5 (H) 36 - 66 %    Lymphocytes % 10.0 (L) 24 - 44 %    Monocytes % 8.7 (H) 0 - 4 %    Eosinophils % 0.2 0 - 3 %    Basophils % 0.2 0 - 1 %    Segs Absolute 15.2 K/CU MM    Lymphocytes Absolute 1.9 K/CU MM    Monocytes Absolute 1.6 K/CU MM    Eosinophils Absolute 0.0 K/CU MM    Basophils Absolute 0.0 K/CU MM    Nucleated RBC % 0.0 %    Total Nucleated RBC 0.0 K/CU MM    Total Immature Neutrophil 0.08 K/CU MM    Immature Neutrophil % 0.4 0 - 0.43 %   CMP   Result Value Ref Range    Sodium 134 (L) 135 - 145 MMOL/L    Potassium 4.0 3.5 - 5.1 MMOL/L    Chloride 100 99 - 110 mMol/L CO2 23 21 - 32 MMOL/L    BUN 11 6 - 23 MG/DL    CREATININE 0.6 (L) 0.9 - 1.3 MG/DL    Glucose 131 (H) 70 - 99 MG/DL    Calcium 9.6 8.3 - 10.6 MG/DL    Albumin 4.5 3.4 - 5.0 GM/DL    Total Protein 7.6 6.4 - 8.2 GM/DL    Total Bilirubin 0.7 0.0 - 1.0 MG/DL    ALT 28 10 - 40 U/L    AST 16 15 - 37 IU/L    Alkaline Phosphatase 69 40 - 129 IU/L    GFR Non-African American >60 >60 mL/min/1.73m2    GFR African American >60 >60 mL/min/1.73m2    Anion Gap 11 4 - 16   Mononucleosis Screen   Result Value Ref Range    Monospot NEGATIVE NEGATIVE          ABNORMAL LABS:  Labs Reviewed   CBC WITH AUTO DIFFERENTIAL - Abnormal; Notable for the following components:       Result Value    WBC 18.9 (*)     MCH 31.5 (*)     Segs Relative 80.5 (*)     Lymphocytes % 10.0 (*)     Monocytes % 8.7 (*)     All other components within normal limits   COMPREHENSIVE METABOLIC PANEL - Abnormal; Notable for the following components:    Sodium 134 (*)     CREATININE 0.6 (*)     Glucose 131 (*)     All other components within normal limits   STREP SCREEN GROUP A THROAT    Narrative:     SETUP DATE/TIME:  10/21/2021 0943   MONONUCLEOSIS SCREEN         IMAGING STUDIES ORDERED:  SALINE LOCK IV  CT SOFT TISSUE NECK W CONTRAST  IP CONSULT TO INTERNAL MEDICINE  IP CONSULT TO HOSPITALIST  IP CONSULT TO OTOLARYNGOLOGY    I have personally viewed the imaging studies. The radiologist interpretation is:   CT SOFT TISSUE NECK W CONTRAST   Final Result   1. There is asymmetric prominence of the left palatine tonsil compared to the   left. There is minimal ill-defined central decreased attenuation within the   left palatine tonsil, which likely represents focal phlegmonous change. No   discrete abscess is seen at this time. 2. There is moderate mucosal edema extending from the left nasopharynx   inferiorly through the left piriform sinus including an edematous appearance   on the left retropharyngeal/prevertebral space.    3. Inflammatory changes are seen extending into the left parapharyngeal fat   and along the left carotid sheath structures. 4. Likely reactive left-sided jugular chain lymph nodes. MEDICATIONS ADMINISTERED:  Medications   sodium chloride flush 0.9 % injection 10 mL (10 mLs IntraVENous Given 10/21/21 1029)   clindamycin (CLEOCIN) 600 mg in dextrose 5 % 50 mL IVPB (600 mg IntraVENous New Bag 10/21/21 1240)   dexamethasone (PF) (DECADRON) injection 10 mg (10 mg IntraVENous Given 10/21/21 0919)   ketorolac (TORADOL) injection 30 mg (30 mg IntraVENous Given 10/21/21 0919)   iopamidol (ISOVUE-370) 76 % injection 80 mL (80 mLs IntraVENous Given 10/21/21 1028)         PLAN/ED COURSE:  Last Vitals: BP (!) 145/93   Pulse 96   Temp 98.8 °F (37.1 °C) (Oral)   Resp 16   SpO2 80       65-year-old male presents with sore throat. Based on the location and the questionable irritation into the carotid sheath we were initially planning on transferring this patient. Other [facility] hospitals are at capacity and not excepting transfers for this type of patient but we do not have ENT on call. Patient is requesting to go home, he is feeling improved after Decadron, he is not having any neurologic symptoms, he is active, not having signs of sepsis. He is able to tolerate p.o., he is placed on a soft diet. He has an appointment with ENT tomorrow morning and is agreeable to return should he have any new worsening signs or symptoms. Based on this being a reliable patient and having close specialty follow-up in the morning we will allow discharge home to arrange close follow-up and will continue outpatient antibiotics and steroids after initial IV antibiotics and steroids given here today. Patient is agreeable to return tomorrow if he does not have clinical improvement. Clinical Impression:  No diagnosis found.     Disposition referral (if applicable):  Parish Gotti MD  [id number]  [phone]    Go in 1 day  Appointment scheduled at 9:45am tomorrow 10/22/2021      Disposition medications (if applicable):  New Prescriptions    No medications on file       ED Provider Disposition Time  DISPOSITION Decision To Transfer 10/21/2021 11:44:06 AM            Electronically signed by Tracie Ortiz MD on 10/21/2021 at 12:49 PM        Tracie Ortiz MD  10/22/21 6974

## 2021-10-21 NOTE — ED NOTES
Discharge instructions reviewed with patient, verbalized understanding and agreeable to discharge.      Darwin Plummer RN  10/21/21 3003

## 2021-10-21 NOTE — LETTER
[facility] Emergency Department  [address] [address]  Phone: [phone]  Fax: [phone]    No name on file. October 21, 2021     Patient: Tootie Alvarez   YOB: 1960   Date of Visit: 10/21/2021       To Whom It May Concern: It is my medical opinion that Ariela Cuevas should remain out of work until October 23, 2021. If you have any questions or concerns, please don't hesitate to call. Sincerely,        No name on file.

## 2021-10-23 LAB
CULTURE: NORMAL
Lab: NORMAL
SPECIMEN: NORMAL
STREP A DIRECT SCREEN: NEGATIVE

## 2022-03-07 ENCOUNTER — HOSPITAL ENCOUNTER (OUTPATIENT)
Age: 62
Discharge: HOME | End: 2022-03-07
Payer: COMMERCIAL

## 2022-03-07 LAB
ALBUMIN SERPL-MCNC: 4.4 GM/DL (ref 3.4–5)
ALP BLD-CCNC: 67 IU/L (ref 40–128)
ALT SERPL-CCNC: 21 U/L (ref 10–40)
ANION GAP SERPL CALCULATED.3IONS-SCNC: 11 MMOL/L (ref 4–16)
AST SERPL-CCNC: 15 IU/L (ref 15–37)
BASOPHILS ABSOLUTE: 0.1 K/CU MM
BASOPHILS RELATIVE PERCENT: 0.6 % (ref 0–1)
BILIRUB SERPL-MCNC: 0.4 MG/DL (ref 0–1)
BUN BLDV-MCNC: 16 MG/DL (ref 6–23)
CALCIUM SERPL-MCNC: 9.3 MG/DL (ref 8.3–10.6)
CHLORIDE BLD-SCNC: 104 MMOL/L (ref 99–110)
CHOLESTEROL: 166 MG/DL
CO2: 22 MMOL/L (ref 21–32)
CREAT SERPL-MCNC: 0.8 MG/DL (ref 0.9–1.3)
DIFFERENTIAL TYPE: ABNORMAL
EOSINOPHILS ABSOLUTE: 0.2 K/CU MM
EOSINOPHILS RELATIVE PERCENT: 1.9 % (ref 0–3)
GFR AFRICAN AMERICAN: >60 ML/MIN/1.73M2
GFR NON-AFRICAN AMERICAN: >60 ML/MIN/1.73M2
GLUCOSE BLD-MCNC: 90 MG/DL (ref 70–99)
HCT VFR BLD CALC: 42.5 % (ref 42–52)
HDLC SERPL-MCNC: 57 MG/DL
HEMOGLOBIN: 14.1 GM/DL (ref 13.5–18)
IMMATURE NEUTROPHIL %: 0.3 % (ref 0–0.43)
LDL CHOLESTEROL CALCULATED: 93 MG/DL
LYMPHOCYTES ABSOLUTE: 3.2 K/CU MM
LYMPHOCYTES RELATIVE PERCENT: 28.8 % (ref 24–44)
MCH RBC QN AUTO: 31.3 PG (ref 27–31)
MCHC RBC AUTO-ENTMCNC: 33.2 % (ref 32–36)
MCV RBC AUTO: 94.2 FL (ref 78–100)
MONOCYTES ABSOLUTE: 0.7 K/CU MM
MONOCYTES RELATIVE PERCENT: 6.4 % (ref 0–4)
NUCLEATED RBC %: 0 %
PDW BLD-RTO: 13.3 % (ref 11.7–14.9)
PLATELET # BLD: 292 K/CU MM (ref 140–440)
PMV BLD AUTO: 10 FL (ref 7.5–11.1)
POTASSIUM SERPL-SCNC: 4.1 MMOL/L (ref 3.5–5.1)
RBC # BLD: 4.51 M/CU MM (ref 4.6–6.2)
SEGMENTED NEUTROPHILS ABSOLUTE COUNT: 6.8 K/CU MM
SEGMENTED NEUTROPHILS RELATIVE PERCENT: 62 % (ref 36–66)
SODIUM BLD-SCNC: 137 MMOL/L (ref 135–145)
T4 FREE: 1.21 NG/DL (ref 0.9–1.8)
TOTAL IMMATURE NEUTOROPHIL: 0.03 K/CU MM
TOTAL NUCLEATED RBC: 0 K/CU MM
TOTAL PROTEIN: 6.9 GM/DL (ref 6.4–8.2)
TRIGL SERPL-MCNC: 78 MG/DL
TSH HIGH SENSITIVITY: 2.3 UIU/ML (ref 0.27–4.2)
WBC # BLD: 11 K/CU MM (ref 4–10.5)

## 2022-03-07 PROCEDURE — 80053 COMPREHEN METABOLIC PANEL: CPT

## 2022-03-07 PROCEDURE — 36415 COLL VENOUS BLD VENIPUNCTURE: CPT

## 2022-03-07 PROCEDURE — 84443 ASSAY THYROID STIM HORMONE: CPT

## 2022-03-07 PROCEDURE — 80061 LIPID PANEL: CPT

## 2022-03-07 PROCEDURE — 84439 ASSAY OF FREE THYROXINE: CPT

## 2022-03-07 PROCEDURE — 85025 COMPLETE CBC W/AUTO DIFF WBC: CPT
